# Patient Record
Sex: FEMALE | Race: BLACK OR AFRICAN AMERICAN | Employment: OTHER | URBAN - METROPOLITAN AREA
[De-identification: names, ages, dates, MRNs, and addresses within clinical notes are randomized per-mention and may not be internally consistent; named-entity substitution may affect disease eponyms.]

---

## 2022-07-07 ENCOUNTER — HOSPITAL ENCOUNTER (OUTPATIENT)
Dept: NUTRITION | Age: 27
Discharge: HOME | End: 2022-07-07
Payer: COMMERCIAL

## 2022-07-07 PROCEDURE — 97802 MEDICAL NUTRITION INDIV IN: CPT

## 2022-07-07 NOTE — PROGRESS NOTES
[address] [place] [address]  [address] [facility], [address] [facility]  Phone: [phone] Fax: [phone]   Nutrition Assessment - Medical Nutrition Therapy   Outpatient Initial Evaluation         Patient Name: Jimenez Fields : 1995   Treatment Diagnosis: Morbid (severe) obesity due to excess calories   Referral Source: Sarai Neumann NP [facility]): 2022     Gender: female Age: 33 y.o. Ht: 63 in Wt:  270 lb  kg   BMI: 47.8 BMR   Male  BMR Female 1933     Past Medical History:  Sleep Apnea, Enlarged Heart     Pertinent Medications:   Patient does not currently take any medications    Biochemical Data:        Assessment:    Patient present to learn about nutrition related to weight loss. Patient reports that she has been consistently gaining weight for the past 4 years. Patient currently works as a  and is on her feet most of the day. Patient does not cook and eats most meals out of the house. Patient reports that she is moving back in with her mother soon. Food & Nutrition: 24 Hour Recall:  Breakfast: skips  Lunch: Whopper value meal   Dinner: same as lunch   Drinks: Dr. Pattie Puente, Milkshake   Snacks: smoothie        Estimate Needs   Calories:  1900 Protein: 119 Carbs: 214 Fat: 63   Kcal/day  g/day  g/day  g/day        percent: 25  45  30               Nutrition Diagnosis   Obesity related to excessive carbohydrate intake as evidenced by 24 hour recall of carbohydrate dense meals and snacks (milkshake, french fries, Whopper burger, soda). Nutrition Intervention &  Education: Educated patient on the need for a consistent carbohydrate intake related to weight loss. Educated patient on nutrition label reading, emphasizing carbohydrates and serving size. Educated patient on protein sources, encouraged patient to incorporate mostly lean protein source with all carbohydrates. Encouraged patient to exercise 150 minutes per week.       Handouts Provided: [x]  Carbohydrates  [x]  Protein  [x]  Non-starchy Vegatbles  [x]  Food Label  [x]  Meal and Snack Ideas  []  Food Journals []  Diabetes  []  Cholesterol  []  Sodium  [x]  Gen Nutr Guidelines  []  SBGM Guidelines  []  Others:   Information Reviewed with: Patient    Readiness to Change Stage: [x]  Pre-contemplative    []  Contemplative  []  Preparation               []  Action                  []  Maintenance   Potential Barriers to Learning: []  Decline in memory    []  Language barrier   []  Other:  []  Emotional                  []  Limited mobility  [x]  Lack of motivation     [] Vision, hearing or cognitive impairment   Expected Compliance: Fair      Nutritional Goal - To promote lifestyle changes to result in:    [x]  Weight loss  []  Improved diabetic control  []  Decreased cholesterol levels  []  Decreased blood pressure  []  Weight maintenance []  Preventing any interactions associated with food allergies  []  Adequate weight gain toward goal weight  []  Other:        Patient Goals:   1. Patient will consume three meals per day 4-5 hours apart. 2. Patient will include a protein at all meals and snacks. 3. Patient will drink 64 ounces of water daily.       Dietitian Signature: Abbie Charles RD Date: 7/7/2022   Follow-up:  Time: 11:08 AM